# Patient Record
Sex: MALE | Race: WHITE
[De-identification: names, ages, dates, MRNs, and addresses within clinical notes are randomized per-mention and may not be internally consistent; named-entity substitution may affect disease eponyms.]

---

## 2017-04-17 ENCOUNTER — HOSPITAL ENCOUNTER (OUTPATIENT)
Dept: HOSPITAL 45 - C.PET | Age: 62
Discharge: HOME | End: 2017-04-17
Attending: RADIOLOGY
Payer: COMMERCIAL

## 2017-04-17 DIAGNOSIS — C77.2: ICD-10-CM

## 2017-04-17 DIAGNOSIS — C81.95: Primary | ICD-10-CM

## 2017-04-17 NOTE — DIAGNOSTIC IMAGING REPORT
PET/CT



HISTORY:      LYMPHOMA



TECHNIQUE: PET/CT was performed from the base of the skull through the pelvis

following the intravenous administration of 13.9 mCi of F18-FDG. Non-contrast CT

imaging was performed over the same range without breath-hold for attenuation

correction of PET images and anatomic correlation, but not for primary

interpretation as it is not of standard diagnostic quality.



CT DOSE: 873.23 mGycm

 

COMPARISON: PET CT 9/26/2016.



FINDINGS:

 

HEAD AND NECK:  Soft tissue thickening within the right posterior nasopharynx

has resolved. The asymmetric FDG uptake has also improved. This demonstrates an

SUV max of 4, previously demonstrating an SUV max of 7.4.

 

CHEST:  There is no FDG-avid disease in the chest.  There is no axillary,

mediastinal, or hilar lymphadenopathy.  There is no pleural or pericardial

effusion.  There is no air-space disease or suspicious lung nodule.

 

ABDOMEN/PELVIS:  Below the diaphragm, tracer is distributed physiologically in

the gastrointestinal and genitourinary tracts. No change in size within the

peripancreatic, mesenteric and retroperitoneal lymphadenopathy. Dominant

peripancreatic lymph node measures 3.2 x 2.1 cm. However, the FDG uptake

associated with these lymph nodes have essentially resolved in the interval. No

new lymphadenopathy.

 

MUSCULOSKELETAL:  There is no FDG-avid or destructive bone lesion.

 

IMPRESSION:  

1. No change in size within the abdominal lymphadenopathy. However, the FDG

uptake associated with these lymph nodes has essentially resolved.

2. Decrease in the FDG uptake associated with the right posterior nasopharynx.

The focal soft tissue thickening has resolved.







Electronically signed by:  Velasquez Melendrez M.D.

4/17/2017 11:39 AM



Dictated Date/Time:  4/17/2017 11:21 AM

## 2017-04-20 ENCOUNTER — HOSPITAL ENCOUNTER (OUTPATIENT)
Dept: HOSPITAL 45 - C.ONC | Age: 62
Discharge: HOME | End: 2017-04-20
Attending: PHYSICIAN ASSISTANT
Payer: COMMERCIAL

## 2017-04-20 VITALS
HEART RATE: 82 BPM | DIASTOLIC BLOOD PRESSURE: 79 MMHG | OXYGEN SATURATION: 95 % | TEMPERATURE: 98.96 F | SYSTOLIC BLOOD PRESSURE: 150 MMHG

## 2017-04-20 DIAGNOSIS — Z85.72: ICD-10-CM

## 2017-04-20 DIAGNOSIS — Z92.3: ICD-10-CM

## 2017-04-20 DIAGNOSIS — Z08: Primary | ICD-10-CM

## 2017-04-20 NOTE — RADIATION ONCOLOGY FOLLOW-UP
Radiation Oncology Follow-Up


Date of Visit


Apr 20, 2017.


 (Margaux Lan PA-C)





Reason For Visit


6 month follow-up


 (Margaux Lan PA-C)





Radiation Completion Date


3/21/16


 (Margaux Lan PA-C)





Diagnosis





(1) Hodgkin lymphoma


Stage:  lll


Permanent Comment:  DIAGNOSIS: Hodgkins Lymphoma, classical, nodular sclerosing

, refractory, stage IIIAS





TREATMENT: 


1. Initial treatment - ABVD x 6 cycles - 6/19/2013


2. Relapse treated with ICE and autologous stem cell transplant - 10/2013


3. Refractory disease has been treated with Brentuximab from 11/2014 to current


4. Status post completion of radiation therapy 03/21/2016 received 4500 cGy to 

the abdomen  Last Edited By: Kassy Pfeiffer on Apr 21, 2016 15:27 (Margaux Lan PA-C)





History of Present Illness


Mr. Luevano is a 62-year-old gentleman who was initially diagnosed with nodular 

sclerosing Hodgkin's lymphoma (stage III) in January 2013.  The patient 

underwent 6 cycles of ABVD chemotherapy and had a good response to treatment.  

Unfortunately he did have a relapse disease in the inguinal lymph nodes which 

was biopsy-proven and then went on to receive 2 cycles of ICE chemotherapy 

followed by an autologous stem cell transplantation which was completed in 

October 2013.  The patient then did have a good response to treatment for 

almost 6 months but then showed a metabolically active lymph nodes in the edmundo 

hepatis, periaortic and other retroperitoneal lymph nodes.  The patient was 

subsequently started on Brentuximab has been on it since November 2014.  He has 

had a mixed response to treatment and more recently has been having progression 

of disease.  His most recent PET scan was completed on 10/21/2015 which 

revealed FDG no luminal avid disease which has worsened since the prior 

examination.  Specifically, there is a left adrenal nodule with increased FDG 

uptake, increased activity adjacent to surgical clips in the edmundo hepatic and 

pyloric region, an FDG avid lymph node in the edmundo hepatic region, enlarging 

periaortic lymph node with maximum SUV of 6.45 and several small FDG avid 

retroperitoneal lymph nodes.  Also noted were splenic lymph nodes with SUV 

uptake of 4.89.  The patient was seen by Dr. Kamaljit Pfeiffer again and the 

recommendation was for consideration of potential consolidative radiation 

therapy for the progressive disease.





Currently, the patient is doing relatively well.  His only complaint of this 

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 

he is asymptomatic.  He denies any fevers, drenching night sweats or chills.  

He has no weight loss and his appetite is good.


Status post completion of radiation therapy to the PET avid areas of the 

abdomen.  Radiation was completed 03/21/2016.  He received 4500 cGy.


 (Margaux Lan PA-C)





Interim History


His been doing well over the past 6 months.  He denies any abdominal 

discomfort.  No nausea or vomiting.  He has had a 12 pound weight gain since 

January.  The PET scan 09/26/2016 showed significant improvement in the FDG 

avid lymphadenopathy within the abdomen.  Using new FDG avid focus within the 

right posterior nasopharynx with associated soft tissue thickening.  This 

raises the possibility of lymphomatous involvement.  He was referred to ENT was 

seen by Dr. Grewal.  On 10/14/2016 he had a nasopharyngeal biopsy and 

adenoidectomy.  The path report revealed atypical lymphoid infiltrate.  

Specimen -S.  This was sent to the [facility] for evaluation by Dr. Consuelo Claudio.  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  A recheck PET 

scan was performed on 04/17/2017.  This showed no change within the abdominal 

lymphadenopathy.  However, the FDG uptake associated with these lymph nodes 

have essentially resolved.  Decrease in the FDG uptake associated with the 

right posterior nasal pharynx.  The focal soft tissue thickening has resolved.





Patient states he did not have any signs or symptoms of sinus problems at the 

time of the finding on the previous PET scan.  He continues to feel fine and 

has no difficulty with his sinuses.


 (Margaux Lan PA-C)





Allergies


Coded Allergies:  


     POLLEN (Verified  Allergy, Intermediate, SNEEZING,WATERY EYES,COUGH, 10/14/

16)


     Cat Dander (Verified  Allergy, Unknown, >, 10/14/16)


     NO KNOWN DRUG ALLERGIES (Verified  Allergy, Unknown, ., 10/14/16)





Home Medications


Scheduled


Alfalfa (Yadkin), 250 MG PO DAILY


Ascorbic Acid (Vitamin C), 500 MG PO DAILY


Calcium W/ Magnesium (Calcium & Magnesium), 1 TAB PO DAILY


Cholecalciferol (Vitamin D), 2,000 INTER.UNIT PO DAILY


Hydrochlorothiazide (Hctz), 25 MG PO QAM


Multiple Vitamin (Multivitamin), 1 TAB PO DAILY


Simvastatin (Zocor), 20 MG PO QAM


[Annona Oil], 1 CAPSULE PO BID





Review of Systems


Gastrointestinal:  


   Symptoms:  WNL


Oral:  


   Symptoms:  No Problems


Respiratory:  


   Symptoms:  WNL


   Respiratory Comments:  PND causes moist cough at times


Urinary:  


   Symptoms:  WNL


Skin:  


   Symptoms:  No Problems


 (Margaux Lan PA-C)





Physical Exam





Vital Signs








  Date Time  Temp Pulse Resp B/P Pulse Ox O2 Delivery O2 Flow Rate FiO2


 


4/20/17 14:38 37.2 82 16 150/79 95   








Fatigue:  None


General Appearance:  no apparent distress


Eyes:  normal inspection, EOMI


ENT:  normal ENT inspection, hearing grossly normal, TMs normal, pharynx normal


Neck:  supple, no adenopathy, thyroid normal


Respiratory/Chest:  lungs clear, no respiratory distress, no accessory muscle 

use


Cardiovascular:  regular rate, rhythm, no gallop, no murmur


Abdomen:  non tender, soft, no organomegaly


Extremities:  no pedal edema


Neurologic/Psychiatric:  no motor/sensory deficits, alert, normal mood/affect


Skin:  warm/dry


Lymphatic:  no adenopathy


 (Margaux Lan PA-C)





Additional Studies


PET/CT





HISTORY:      LYMPHOMA





TECHNIQUE: PET/CT was performed from the base of the skull through the pelvis


following the intravenous administration of 13.9 mCi of F18-FDG. Non-contrast CT


imaging was performed over the same range without breath-hold for attenuation


correction of PET images and anatomic correlation, but not for primary


interpretation as it is not of standard diagnostic quality.





CT DOSE: 873.23 mGycm


 


COMPARISON: PET CT 9/26/2016.





FINDINGS:


 


HEAD AND NECK:  Soft tissue thickening within the right posterior nasopharynx


has resolved. The asymmetric FDG uptake has also improved. This demonstrates an


SUV max of 4, previously demonstrating an SUV max of 7.4.


 


CHEST:  There is no FDG-avid disease in the chest.  There is no axillary,


mediastinal, or hilar lymphadenopathy.  There is no pleural or pericardial


effusion.  There is no air-space disease or suspicious lung nodule.


 


ABDOMEN/PELVIS:  Below the diaphragm, tracer is distributed physiologically in


the gastrointestinal and genitourinary tracts. No change in size within the


peripancreatic, mesenteric and retroperitoneal lymphadenopathy. Dominant


peripancreatic lymph node measures 3.2 x 2.1 cm. However, the FDG uptake


associated with these lymph nodes have essentially resolved in the interval. No


new lymphadenopathy.


 


MUSCULOSKELETAL:  There is no FDG-avid or destructive bone lesion.


 


IMPRESSION:  


1. No change in size within the abdominal lymphadenopathy. However, the FDG


uptake associated with these lymph nodes has essentially resolved.


2. Decrease in the FDG uptake associated with the right posterior nasopharynx.


The focal soft tissue thickening has resolved.











Electronically signed by:  Velasquez Melendrez M.D.


4/17/2017 11:39 AM





Dictated Date/Time:  4/17/2017 11:21 AM





 


 (Margaux Lan PA-C)





Assessment & Plan


Plan: The patient was seen and examined by Dr. Pfeiffer.  The studies were 

reviewed.  We'll plan to follow up with a recheck PET scan in 6 months.  Most 

recent laboratory studies were reviewed.  An NCCN guidelines were reviewed.  We'

ll have him return in 6 months.  We'll obtain laboratory studies at that time 

if they have not been obtained by his PCP.  This would include a CBCD and chem 

profile.  He may call PSA questions or concerns in the interim.


 (Margaux Lan PA-C)


I agree with note created by Margaux Lan PA-C. I reviewed the patient's 

chart and information with her.  I have examined and evaluated the patient. I 

reviewed relevant clinical information and answered the patient's and/or family'

s questions. 


 (Parag Pfeiffer MD)


Total Time In Follow-Up


I spent 20 minutes speaking to the patient performing examination.  I spent 15 

minutes reviewing information in completing this note.


 (Margaux Lan PA-C)


I spent 15 minutes examining and counseling the patient.  


 (Parag Pfeiffer MD)





Copy To


Britta Layne D.O.

## 2017-10-23 ENCOUNTER — HOSPITAL ENCOUNTER (OUTPATIENT)
Dept: HOSPITAL 45 - C.PET | Age: 62
Discharge: HOME | End: 2017-10-23
Attending: PHYSICIAN ASSISTANT
Payer: COMMERCIAL

## 2017-10-23 DIAGNOSIS — C81.95: Primary | ICD-10-CM

## 2017-10-23 DIAGNOSIS — C77.2: ICD-10-CM

## 2017-10-23 NOTE — DIAGNOSTIC IMAGING REPORT
******** ADDENDUM ********





Addendum to the following report. The dictated report on 10/23/2017

inadvertently corresponds to the images from PET/CT 4/17/2017. The entire report

will be redictated within the findings section of the following addendum.





FINDINGS:



HEAD AND NECK: Decreased metabolic activity about the nasopharynx from

comparison with only slightly increased uptake about the left nasopharynx, SUV

max 2.8 suggesting physiologic or inflammatory changes. Mildly increased

radiotracer uptake of the oral pharynx, glottis and left true vocal cords

without corresponding soft tissue abnormality on the CT component of the study

is likely physiologic as well, possibly related to recent speech. No

hypermetabolic adenopathy of the head and neck identified.



CHEST: There is new multifocal FDG avid adenopathy about the chest.

Hypermetabolic AP window lymph node measuring up to 8 mm in short axis on image

80 of series 2 with SUV max of 3.3. Hypermetabolic 8 mm lymph node posterior to

the left mainstem bronchus on image 86 series 2 with SUV max of 5.4. Enlarged

lymph node anterior to the right mainstem bronchus measuring 1.3 cm on image 83

series 2 demonstrates SUV max of 5.1. The largest lymph node of the chest

involves the left hilum on image 91 series 2, 2.8 x 1.6 cm with SUV max of 5.9.

Additional smaller hypermetabolic left hilar lymph nodes are also present. These

are all new from comparison study of 4/17/2017. No hypermetabolic pulmonary

nodules or masses identified.



ABDOMEN AND PELVIS: The spleen is normal in size without evidence of

hypermetabolic activity. Physiologic radiotracer uptake involves the liver,

kidneys and gastrointestinal tract. There are persistent enlarged lymph nodes

about the abdomen including peripancreatic, mesenteric and retroperitoneal lymph

nodes. For example, there is an enlarged gastrohepatic lymph node, 3.2 x 2.0 cm

which previously measured 3.1 x 2.1 cm. Lymph node posterior to the splenic vein

on image 145 series 2 measures 11 mm in short axis, previously 12 mm. 11 mm

lymph node on series 151 series 2 previously measured 10 mm. Enlarged left

paraaortic lymph node on image 170 series 2 measures 13.5 mm in short axis,

previously 15 mm. No new adenopathy of the abdomen or pelvis. No significant FDG

uptake is seen within the enlarged lymph nodes.



MUSCULOSKELETAL SYSTEM AND EXTREMITIES: Physiologic distribution of activity

within the bone marrow without hypermetabolic foci. Mildly increased uptake

about the hips and shoulders is likely degenerative related.



INCIDENTAL CT FINDINGS: Heart is mildly enlarged. Coronary arterial

calcifications are noted. Symmetric bilateral gynecomastia. Lung fields are

generally clear with minimal dependent bibasilar atelectasis. Fatty infiltration

of the liver. Unchanged mild stranding of the mid mesentery adjacent to the

previously described enlarged lymph nodes. No bowel obstruction. Partially

collapsed urinary bladder. Normal appendix. Mild symmetric atrophy of the

bilateral rectus femoris musculature.





IMPRESSION:

1. New bulky hypermetabolic mediastinal and hilar adenopathy is consistent with

disease progression.

2. Stable disease of the abdomen.

3. Normal appearance of the spleen and bone marrow.

4. Additional findings as above.







Electronically signed by:  Ari Isaacs M.D.

10/25/2017 8:05 PM



Dictated Date/Time:  10/25/2017 7:31 PM



******** ORIGINAL REPORT ********





PET/CT SKULL-THIGH



CLINICAL HISTORY: 62 years-old Male with LYMPHOMA.  Subsequent treatment

strategy. FDG avid lymphadenopathy of the abdomen seen on comparison study.



COMPARISON: PET CT 4/17/2017



TECHNIQUE: The patient was injected with 10.8 mCi of F-18 fluorodeoxyglucose

(FDG) and an emission scan was performed from the skull vertex to the toes.

Noncontrast CT was performed for attenuation correction and anatomic

localization.  The blood glucose level was 110 mg/dl.



FINDINGS:



HEAD AND NECK: Generally symmetric increased metabolic activity is noted within

the nasopharynx, SUV max of 5.9 on the right, previously measured at 4. No

appreciable soft tissue thickening within this distribution. Otherwise, there is

a physiologic distribution of activity, with no suspicious hypermetabolic foci.



CHEST: There is a physiologic distribution of activity, with no hypermetabolic

mediastinal, hilar or pulmonary foci.



ABDOMEN AND PELVIS: Multiple enlarged peripancreatic, retroperitoneal and

mesenteric lymph nodes are again seen which appear stable in size and appearance

from comparison. Index peripancreatic lymph node on image 139 series 2 measures

3.2 x 2.0 cm, previously 3.2 x 2.1 cm. Mild stranding within the mesentery

adjacent to these nodes again seen. There is no significant FDG uptake within

the enlarged lymph nodes. No new adenopathy. There is a physiologic distribution

of activity within the liver, spleen, adrenal glands, gastrointestinal and

urinary tracts, with no hypermetabolic foci.



MUSCULOSKELETAL SYSTEM AND EXTREMITIES: There is a physiologic distribution of

activity within the bone marrow, with no hypermetabolic foci.



ADDITIONAL CT FINDINGS: Coronary arterial disease and mild cardiomegaly.

Symmetric bilateral gynecomastia. Dependent bibasilar atelectasis. Fatty

infiltration of the liver. Moderate to severe diffuse pancreatic atrophy.

Atherosclerosis of the abdominal aorta. No bowel obstruction. Normal appendix.

Mild atrophy of the bilateral rectus femoris musculature.



IMPRESSION: 

1. Stable exam with no change in size or appearance of the abdominal adenopathy

demonstrating no significant hypermetabolic activity. No new adenopathy

identified.

2. Mild symmetrically increased FDG uptake about the nasopharynx as above

suggests physiologic changes without focal mass or soft tissue thickening.

3. Fatty infiltration of the liver.







The above report was generated using voice recognition software. It may contain

grammatical, syntax or spelling errors.











Electronically signed by:  Ari Isaacs M.D.

10/23/2017 3:00 PM



Dictated Date/Time:  10/23/2017 2:44 PM

## 2017-10-25 ENCOUNTER — HOSPITAL ENCOUNTER (OUTPATIENT)
Dept: HOSPITAL 45 - C.ONC | Age: 62
Discharge: HOME | End: 2017-10-25
Attending: PHYSICIAN ASSISTANT
Payer: COMMERCIAL

## 2017-10-25 VITALS
HEART RATE: 77 BPM | OXYGEN SATURATION: 96 % | SYSTOLIC BLOOD PRESSURE: 147 MMHG | DIASTOLIC BLOOD PRESSURE: 80 MMHG | TEMPERATURE: 98.6 F

## 2017-10-25 DIAGNOSIS — Z85.72: ICD-10-CM

## 2017-10-25 DIAGNOSIS — Z92.3: ICD-10-CM

## 2017-10-25 DIAGNOSIS — Z08: Primary | ICD-10-CM

## 2017-10-25 NOTE — RADIATION ONCOLOGY FOLLOW-UP
Radiation Oncology Follow-Up


Date of Visit


Oct 25, 2017.





Reason For Visit


6 month follow-up





Radiation Completion Date


03/21/16





Diagnosis





(1) Hodgkin lymphoma


Permanent Comment:  DIAGNOSIS: Hodgkins Lymphoma, classical, nodular sclerosing

, refractory, stage IIIAS





TREATMENT: 


1. Initial treatment - ABVD x 6 cycles - 6/19/2013


2. Relapse treated with ICE and autologous stem cell transplant - 10/2013


3. Refractory disease has been treated with Brentuximab from 11/2014 to current


4. Status post completion of radiation therapy 03/21/2016 received 4500 cGy to 

the abdomen  Last Edited By: Kassy Pfeiffer on Apr 21, 2016 15:27





History of Present Illness


Mr. Luevano is a 62-year-old gentleman who was initially diagnosed with nodular 

sclerosing Hodgkin's lymphoma (stage III) in January 2013.  The patient 

underwent 6 cycles of ABVD chemotherapy and had a good response to treatment.  

Unfortunately he did have a relapse disease in the inguinal lymph nodes which 

was biopsy-proven and then went on to receive 2 cycles of ICE chemotherapy 

followed by an autologous stem cell transplantation which was completed in 

October 2013.  The patient then did have a good response to treatment for 

almost 6 months but then showed a metabolically active lymph nodes in the edmundo 

hepatis, periaortic and other retroperitoneal lymph nodes.  The patient was 

subsequently started on Brentuximab has been on it since November 2014.  He has 

had a mixed response to treatment and more recently has been having progression 

of disease.  His most recent PET scan was completed on 10/21/2015 which 

revealed FDG no luminal avid disease which has worsened since the prior 

examination.  Specifically, there is a left adrenal nodule with increased FDG 

uptake, increased activity adjacent to surgical clips in the edmundo hepatic and 

pyloric region, an FDG avid lymph node in the edmundo hepatic region, enlarging 

periaortic lymph node with maximum SUV of 6.45 and several small FDG avid 

retroperitoneal lymph nodes.  Also noted were splenic lymph nodes with SUV 

uptake of 4.89.  The patient was seen by Dr. Kamaljit Pfeiffer again and the 

recommendation was for consideration of potential consolidative radiation 

therapy for the progressive disease.





Currently, the patient is doing relatively well.  His only complaint of this 

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 

he is asymptomatic.  He denies any fevers, drenching night sweats or chills.  

He has no weight loss and his appetite is good.


Status post completion of radiation therapy to the PET avid areas of the 

abdomen.  Radiation was completed 03/21/2016.  He received 4500 cGy.





Interim History


He does not report any changes over the past 6 months.  His weight is stable.  

He does exercise on a regular basis.  His appetite is good.  He did not have 

any complaints of night sweats.  He has had some nasal drainage with chest 

congestion.  There has been a productive cough.  He has persistent neuropathy 

of the fingers and toes since the chemotherapy.





Allergies


Coded Allergies:  


     POLLEN (Verified  Allergy, Intermediate, SNEEZING,WATERY EYES,COUGH, 10/14/

16)


     Cat Dander (Verified  Allergy, Unknown, >, 10/14/16)


     NO KNOWN DRUG ALLERGIES (Verified  Allergy, Unknown, ., 10/14/16)





Home Medications


Scheduled


Alfalfa (Caswell), 250 MG PO DAILY


Ascorbic Acid (Vitamin C), 500 MG PO DAILY


Calcium W/ Magnesium (Calcium & Magnesium), 1 TAB PO DAILY


Cholecalciferol (Vitamin D), 2,000 INTER.UNIT PO DAILY


Hydrochlorothiazide (Hctz), 25 MG PO QAM


Multiple Vitamin (Multivitamin), 1 TAB PO DAILY


Simvastatin (Zocor), 20 MG PO QAM


[Skyforest Oil], 1 CAPSULE PO BID





Review of Systems


Gastrointestinal:  


   Symptoms:  WNL


Oral:  


   Other Oral Symptoms:  nasal drainage with weather changes


Respiratory:  


   Symptoms:  Productive Cough


   Sputum Character:  does not check the color cough due to nasal drainage


Urinary:  


   Symptoms:  WNL


Skin:  


   Symptoms:  No Problems





Physical Exam





Vital Signs








  Date Time  Temp Pulse Resp B/P (MAP) Pulse Ox O2 Delivery O2 Flow Rate FiO2


 


10/25/17 13:31 37.0 77 16 147/80 96   








Fatigue:  None


General Appearance:  no apparent distress


Eyes:  normal inspection, EOMI


ENT:  normal ENT inspection, hearing grossly normal


Neck:  no adenopathy, thyroid normal


Respiratory/Chest:  lungs clear, no respiratory distress, no accessory muscle 

use


Cardiovascular:  regular rate, rhythm, no gallop, no murmur


Abdomen:  non tender, soft, no organomegaly


Extremities:  no pedal edema


Neurologic/Psychiatric:  no motor/sensory deficits, alert, normal mood/affect


Skin:  warm/dry





Additional Studies


 











Patient:  UBALDOJOSE DE JESUS BARTH Address1:  [address] [place] [address] Rec:  [id number] Address2:  


 


Acct ID:  [id number] [facility] [place] Zip:  [place] [id number]


 


YOB: 1955          Sex:  M Room/Bed:  


 


Ref Phy:   SC: ROMIEPET


 


Att Phy:  Margaux Lan PA-C Report #:  [id number]


 


Kamilah Phy:  Britta Layne D.O. Test:  PETCTST


 


Admit Phy:   Technician:  RHONDA


 


Interpreting Phy:  Ottoniel Isaacs D.O. Diagnosis:  LYMPHOMA


 


Ordering Phy:  Margaux Lan PA-C Service Date:  10/23/17


 


Admit Date: 10/23/17 MNE: [email or web address]


 


 CONF:


 


 DICTATED BY: Ottoniel Isaacs D.O.]]


 


CC: Margaux Lan PA-C Kopinski, Laura, D.O.


 


 Endcc:











[~ rep ct add3]]








******** ADDENDUM ********








Addendum to the following report. The dictated report on 10/23/2017


inadvertently corresponds to the images from PET/CT 4/17/2017. The entire report


will be redictated within the findings section of the following addendum.








FINDINGS:





HEAD AND NECK: Decreased metabolic activity about the nasopharynx from


comparison with only slightly increased uptake about the left nasopharynx, SUV


max 2.8 suggesting physiologic or inflammatory changes. Mildly increased


radiotracer uptake of the oral pharynx, glottis and left true vocal cords


without corresponding soft tissue abnormality on the CT component of the study


is likely physiologic as well, possibly related to recent speech. No


hypermetabolic adenopathy of the head and neck identified.





CHEST: There is new multifocal FDG avid adenopathy about the chest.


Hypermetabolic AP window lymph node measuring up to 8 mm in short axis on image


80 of series 2 with SUV max of 3.3. Hypermetabolic 8 mm lymph node posterior to


the left mainstem bronchus on image 86 series 2 with SUV max of 5.4. Enlarged


lymph node anterior to the right mainstem bronchus measuring 1.3 cm on image 83


series 2 demonstrates SUV max of 5.1. The largest lymph node of the chest


involves the left hilum on image 91 series 2, 2.8 x 1.6 cm with SUV max of 5.9.


Additional smaller hypermetabolic left hilar lymph nodes are also present. These


are all new from comparison study of 4/17/2017. No hypermetabolic pulmonary


nodules or masses identified.





ABDOMEN AND PELVIS: The spleen is normal in size without evidence of


hypermetabolic activity. Physiologic radiotracer uptake involves the liver,


kidneys and gastrointestinal tract. There are persistent enlarged lymph nodes


about the abdomen including peripancreatic, mesenteric and retroperitoneal lymph


nodes. For example, there is an enlarged gastrohepatic lymph node, 3.2 x 2.0 cm


which previously measured 3.1 x 2.1 cm. Lymph node posterior to the splenic vein


on image 145 series 2 measures 11 mm in short axis, previously 12 mm. 11 mm


lymph node on series 151 series 2 previously measured 10 mm. Enlarged left


paraaortic lymph node on image 170 series 2 measures 13.5 mm in short axis,


previously 15 mm. No new adenopathy of the abdomen or pelvis. No significant FDG


uptake is seen within the enlarged lymph nodes.





MUSCULOSKELETAL SYSTEM AND EXTREMITIES: Physiologic distribution of activity


within the bone marrow without hypermetabolic foci. Mildly increased uptake


about the hips and shoulders is likely degenerative related.





INCIDENTAL CT FINDINGS: Heart is mildly enlarged. Coronary arterial


calcifications are noted. Symmetric bilateral gynecomastia. Lung fields are


generally clear with minimal dependent bibasilar atelectasis. Fatty infiltration


of the liver. Unchanged mild stranding of the mid mesentery adjacent to the


previously described enlarged lymph nodes. No bowel obstruction. Partially


collapsed urinary bladder. Normal appendix. Mild symmetric atrophy of the


bilateral rectus femoris musculature.








IMPRESSION:


1. New bulky hypermetabolic mediastinal and hilar adenopathy is consistent with


disease progression.


2. Stable disease of the abdomen.


3. Normal appearance of the spleen and bone marrow.


4. Additional findings as above.











Electronically signed by:  Ari Isaacs M.D.


10/25/2017 8:05 PM





Dictated Date/Time:  10/25/2017 7:31 PM





******** ORIGINAL REPORT ********








PET/CT SKULL-THIGH





CLINICAL HISTORY: 62 years-old Male with LYMPHOMA.  Subsequent treatment


strategy. FDG avid lymphadenopathy of the abdomen seen on comparison study.





COMPARISON: PET CT 4/17/2017





TECHNIQUE: The patient was injected with 10.8 mCi of F-18 fluorodeoxyglucose


(FDG) and an emission scan was performed from the skull vertex to the toes.


Noncontrast CT was performed for attenuation correction and anatomic


localization.  The blood glucose level was 110 mg/dl.





FINDINGS:





HEAD AND NECK: Generally symmetric increased metabolic activity is noted within


the nasopharynx, SUV max of 5.9 on the right, previously measured at 4. No


appreciable soft tissue thickening within this distribution. Otherwise, there is


a physiologic distribution of activity, with no suspicious hypermetabolic foci.





CHEST: There is a physiologic distribution of activity, with no hypermetabolic


mediastinal, hilar or pulmonary foci.





ABDOMEN AND PELVIS: Multiple enlarged peripancreatic, retroperitoneal and


mesenteric lymph nodes are again seen which appear stable in size and appearance


from comparison. Index peripancreatic lymph node on image 139 series 2 measures


3.2 x 2.0 cm, previously 3.2 x 2.1 cm. Mild stranding within the mesentery


adjacent to these nodes again seen. There is no significant FDG uptake within


the enlarged lymph nodes. No new adenopathy. There is a physiologic distribution


of activity within the liver, spleen, adrenal glands, gastrointestinal and


urinary tracts, with no hypermetabolic foci.





MUSCULOSKELETAL SYSTEM AND EXTREMITIES: There is a physiologic distribution of


activity within the bone marrow, with no hypermetabolic foci.





ADDITIONAL CT FINDINGS: Coronary arterial disease and mild cardiomegaly.


Symmetric bilateral gynecomastia. Dependent bibasilar atelectasis. Fatty


infiltration of the liver. Moderate to severe diffuse pancreatic atrophy.


Atherosclerosis of the abdominal aorta. No bowel obstruction. Normal appendix.


Mild atrophy of the bilateral rectus femoris musculature.





IMPRESSION: 


1. Stable exam with no change in size or appearance of the abdominal adenopathy


demonstrating no significant hypermetabolic activity. No new adenopathy


identified.


2. Mild symmetrically increased FDG uptake about the nasopharynx as above


suggests physiologic changes without focal mass or soft tissue thickening.


3. Fatty infiltration of the liver.











The above report was generated using voice recognition software. It may contain


grammatical, syntax or spelling errors.

















Electronically signed by:  Ari Isaacs M.D.


10/23/2017 3:00 PM





Dictated Date/Time:  10/23/2017 2:44 PM





Assessment & Plan


Plan: The results of the PET scan were reviewed with the patient.  This was 

showing stable changes.  The PET/CT was reviewed by Dr. Pfeiffer with radiology 

and it is felt that there is uptake in the chest.  This information was 

reviewed by Dr. Pfeiffer with the patient.  Patient's case will be reviewed at 

tumor board.  The PET scan results were amended by radiology.





Assessment & Plan (Attending)


Mr. Luevano unfortunately appears to have developed recurrent Hodgkins lymphoma 

in the mediastinum and potentially in the pelvis as well. I have re-reviewed 

the imaging studies with radiology who agrees as well. At this point, we did 

have an in-depth discussion regarding moving forward. Our discussion included 

potentially obtaining a biopsy to confirm recurrence in addition to speaking 

with a medical oncologist.  Until now, the patient has been reluctant to 

reconsider systemic therapy due to his previous side effects after treatment 

and would prefer radiation therapy. I have explained to him that radiation 

therapy is most likely not going to be beneficial at this point and I would 

strongly urge the patient to consider systemic therapy potentially underneath 

the supervision of a medical oncologist. At this point, the patient has 

requested that I present his case at our next tumor board and then call him 

with the final recommendations which may include obtaining a tissue diagnosis 

and/or referral to medical oncology. We are in agreement with this plan and I 

will call him after tumor board next week. He was encouraged to call me with 

any further questions or concerns.





Total Time


In Follow-Up


I spent 20 minutes speaking to the patient and performing examination.  I spent 

15 minutes reviewing information in completing this note.  AK





Total Time (Attending)


In Follow-Up


I spent 30 minutes examining and counseling the patient. I spent 15 minutes 

completing this note. 





Copy To


Britta Layne D.O.; Kamaljit Pfeiffer M.D.

## 2017-12-01 ENCOUNTER — HOSPITAL ENCOUNTER (OUTPATIENT)
Dept: HOSPITAL 45 - C.LAB1850 | Age: 62
Discharge: HOME | End: 2017-12-01
Attending: INTERNAL MEDICINE
Payer: COMMERCIAL

## 2017-12-01 DIAGNOSIS — R59.0: Primary | ICD-10-CM

## 2017-12-01 LAB
BASOPHILS # BLD: 0.01 K/UL (ref 0–0.2)
BASOPHILS NFR BLD: 0.2 %
COMPLETE: YES
EOSINOPHIL NFR BLD AUTO: 89 K/UL (ref 130–400)
HCT VFR BLD CALC: 33.8 % (ref 42–52)
IG%: 0.8 %
IMM GRANULOCYTES NFR BLD AUTO: 18.7 %
INR PPP: 0.9 (ref 0.9–1.1)
LYMPHOCYTES # BLD: 0.95 K/UL (ref 1.2–3.4)
MCH RBC QN AUTO: 32.9 PG (ref 25–34)
MCHC RBC AUTO-ENTMCNC: 34.3 G/DL (ref 32–36)
MCV RBC AUTO: 95.8 FL (ref 80–100)
MONOCYTES NFR BLD: 9.4 %
NEUTROPHILS # BLD AUTO: 1.4 %
NEUTROPHILS NFR BLD AUTO: 69.5 %
PARTIAL THROMBOPLASTIN RATIO: 1
PLATELET # BLD EST: (no result) 10*3/UL
PMV BLD AUTO: 10.9 FL (ref 7.4–10.4)
PROTHROMBIN TIME: 10.1 SECONDS (ref 9–12)
RBC # BLD AUTO: 3.53 M/UL (ref 4.7–6.1)
WBC # BLD AUTO: 5.08 K/UL (ref 4.8–10.8)

## 2017-12-05 ENCOUNTER — HOSPITAL ENCOUNTER (OUTPATIENT)
Dept: HOSPITAL 45 - C.ACU | Age: 62
Discharge: HOME | End: 2017-12-05
Attending: INTERNAL MEDICINE
Payer: COMMERCIAL

## 2017-12-05 VITALS
OXYGEN SATURATION: 96 % | HEART RATE: 65 BPM | TEMPERATURE: 98.24 F | DIASTOLIC BLOOD PRESSURE: 66 MMHG | SYSTOLIC BLOOD PRESSURE: 114 MMHG

## 2017-12-05 VITALS
WEIGHT: 230.49 LBS | HEIGHT: 72.01 IN | HEIGHT: 72.01 IN | BODY MASS INDEX: 31.22 KG/M2 | WEIGHT: 230.49 LBS | BODY MASS INDEX: 31.22 KG/M2 | BODY MASS INDEX: 31.22 KG/M2

## 2017-12-05 VITALS
OXYGEN SATURATION: 96 % | DIASTOLIC BLOOD PRESSURE: 53 MMHG | TEMPERATURE: 97.88 F | HEART RATE: 64 BPM | SYSTOLIC BLOOD PRESSURE: 100 MMHG

## 2017-12-05 VITALS
SYSTOLIC BLOOD PRESSURE: 114 MMHG | OXYGEN SATURATION: 95 % | TEMPERATURE: 97.88 F | DIASTOLIC BLOOD PRESSURE: 56 MMHG | HEART RATE: 60 BPM

## 2017-12-05 DIAGNOSIS — Z79.899: ICD-10-CM

## 2017-12-05 DIAGNOSIS — Z85.828: ICD-10-CM

## 2017-12-05 DIAGNOSIS — J39.2: ICD-10-CM

## 2017-12-05 DIAGNOSIS — C81.92: Primary | ICD-10-CM

## 2017-12-05 LAB
ANION GAP SERPL CALC-SCNC: 5 MMOL/L (ref 3–11)
BUN SERPL-MCNC: 17 MG/DL (ref 7–18)
BUN/CREAT SERPL: 19.3 (ref 10–20)
CALCIUM SERPL-MCNC: 8.6 MG/DL (ref 8.5–10.1)
CHLORIDE SERPL-SCNC: 106 MMOL/L (ref 98–107)
CO2 SERPL-SCNC: 26 MMOL/L (ref 21–32)
CREAT CL PREDICTED SERPL C-G-VRATE: 107.6 ML/MIN
CREAT SERPL-MCNC: 0.89 MG/DL (ref 0.6–1.4)
GLUCOSE SERPL-MCNC: 108 MG/DL (ref 70–99)
POTASSIUM SERPL-SCNC: 3.9 MMOL/L (ref 3.5–5.1)
SODIUM SERPL-SCNC: 137 MMOL/L (ref 136–145)

## 2017-12-05 NOTE — ANESTHESIOLOGY PROGRESS NOTE
Anesthesia Post Op Note


Date & Time


Dec 5, 2017 at 13:59





Vital Signs


Pain Intensity:  0





Vital Signs Past 12 Hours








  Date Time  Temp Pulse Resp B/P (MAP) Pulse Ox O2 Delivery O2 Flow Rate FiO2


 


12/5/17 13:45  66 16 111/75 100 Oxymask 10 


 


12/5/17 13:35 36.4 74 16 128/83 99 Oxymask 10 


 


12/5/17 10:27 36.8 65 18 114/66 (82) 96 Room Air  











Notes


Mental Status:  alert / awake / arousable, participated in evaluation


Pt Amnestic to Procedure:  Yes


Nausea / Vomiting:  adequately controlled


Pain:  adequately controlled


Airway Patency, RR, SpO2:  stable & adequate


BP & HR:  stable & adequate


Hydration State:  stable & adequate


Anesthetic Complications:  no major complications apparent

## 2017-12-05 NOTE — HISTORY AND PHYSICAL
History & Physical


Date of Service


Dec 5, 2017.





History & Physical


Reason for Visit: EBUS evaluation





HPI:


62-year-old male here for evaluation on PET avid mediastinum:





He presents with notable PET avidity in his mediastinum with a diagnosis since 

12/31/2012 based off retroperitoneal mass revealing sclerosing Hodgkin's 

lymphoma. He has been treated with multiple different chemotherapeutic agents, 

autologous stem cell transplant is mile as radiation. He is being followed by 

Dr. Jordi Monroy. 





He initially presented in November 2012 with weakness and workup revealed 

enlarged mediastinal adenopathy along with left axillary lymph nodes. Biopsy of 

the left axillary and mediastinal nodes on 11/13/2012 were nondiagnostic but 

were suspicious for classical Hodgkin's lymphoma. Biopsy of a retroperitoneal 

mass on 12/31/2012 revealed classic Hodgkin's lymphoma, nodular sclerosing 

type. He was then treated with 6 cycles of ABVD from January 2013 through June of 2013. Follow-up PET-CT revealed FDG avid inguinal node which was biopsied on 

08/06/2013 revealed Hodgkin's lymphoma. He was then treated with 2 cycles of 

ICE followed by autologous stem cell transplant days 0 being 10/16/2013 at 

[facility]. On 08/13/2014 he did show some PET/CT FDG avidity in 

multiple abdominal lymph nodes suggesting progression. He then underwent 

laparoscopic biopsy on 10/06/2014 confirming relapsed Hodgkin's lymphoma. He 

was then moved to Brentuximab on 11/10/2014. Patient was noted both clinically 

and via PET-CT scans have excellent response after 4 cycles but during the 

therapy did require a dose reduction secondary to neuropathy. This treatment 

was continued until 01/2016. Repeat PET-CT on 01/20/2016 revealed increased in 

both size and metabolic activity numerous abdominal lymph nodes. He underwent 

IM RT with a total of 45 centigray over 25 fractions from [id number]. He 

was noted to have complete response in September 2016. This patient has been 

continually monitored and recent PET-CT on 10/23/2017 revealed bulky 

hypermetabolic mediastinal/hilar adenopathy. Biopsy of the patient's axillary 

mediastinal lymph nodes was not on diagnostic on 12/31/2013 Dr. Monroy 

clinical evaluation performed 11/20/2017 noted no clinical signs or symptoms 

recurrent disease.





Active Problems


1. Actinic keratosis (L57.0)


2. History of SCC (squamous cell carcinoma) of skin (Z85.828)


3. Lesion of nasopharynx (J39.2)


4. Neoplasm of uncertain behavior of skin (D48.5)


5. Nummular eczema (L30.0)


6. Squamous cell carcinoma in situ (D09.9)


7. Visit for pre-operative examination (Z01.818)





Past Medical History


1. History of actinic keratosis (Z87.2)


2. Denied: History of malignant hyperthermia


3. History of seborrheic dermatitis (Z87.2)


4. History of squamous cell carcinoma of skin (Z85.828)


5. History of Keratoacanthoma (L85.8)





Surgical History


1. History of Knee Surgery





Family History


1. Denied: Family history of Bleeding disorder


2. Family history of skin cancer (Z80.8)





Social History


 Alcohol use (Z78.9)


 Employed


 Former smokeless tobacco user


 Never a smoker





Current Meds


 1. Fluorouracil 5 % External Cream; APPLY A THIN LAYER TO AFFECTED AREA(S) 

TWICE DAILY


 2. Mometasone Furoate 0.1 % External Ointment; APPLY SPARINGLY TO AFFECTED AREA

(S) ONCE DAILY


 3. HydroCHLOROthiazide 25 MG Oral Tablet


 4. Simvastatin 20 MG Oral Tablet





Allergies


1. No Known Drug Allergies





Physical Exam:


General: Patient is awake, alert, cooperative, and in no acute distress. Well 

developed.  Well-nourished.


Head: Normocephalic, Atraumatic.


ENT: PERRLA, No discharge, EOMI, Sclera normal


Neck: Normal ROM. Trachea midline. No stridor


Respiratory:No respiratory distress. No accessory muscle use.


Cardiovascular: Regular rate and rhythm. 


Back: Normal inspection. 


Extremities: No edema, cyanosis. Normal ROM


Neuro: Alert, Oriented x 3. CN II-XII grossly intact. Sensation and motor 

function grossly intact.








Assessment and Plan:


Patient with Hodgkins lymphoma and mediastinal lymphadenopathy here for EBUS 

evaluation today and tissue sampling.

## 2017-12-05 NOTE — BRONCHOSCOPY PROCEDURE NOTE
Bronchoscopy Procedure Note


Procedure: Flexible-Bronchoscopy, EBUS,FNA, Tbbx, BAL   


Consent: Obtained through the patient placed into the chart


Pre-Procedural Dx: Mediastinal adenopathy


Post-Procedural Dx: Recurrence of sclerosing Hodgkin's of trauma


Analgesia:


   GETA


Sedation:


   GETA


Procedure:


The Olympus video bronchoscope and EBUS scope were used for this procedure 


Initially the flexible bronchoscope was used for evaluation of the airways.


An LMA Size 5 was used for this procedure and properly positioned


Vocal Cords: Anatomically WNL


Sub-Glottis & Trachea:   Anatomically WNL


Melina: Anatomically within normal limits


Right bronchial tree:


   Right mainstem bronchus: Anatomically within normal limits


   Right upper lobe: Anatomically within normal limits


   Bronchus intermedius: Anatomically within normal limits


   Right middle lobe: Anatomically within normal limits


   Right lower lobe: Anatomically within normal limits


   Findings: No significant findings noted


Left bronchial tree:


   Left mainstem bronchus: Anatomically within normal limits


   Left upper lobe: Anatomically within normal limits


   Lingula: Anatomically within normal limits


   Left lower lobe: Anatomically within normal limits


   Findings: No significant findings noted


EBUS/BHUMIKA: FNA


   Brit Stations:


   7:  # of passes  8


   10R: # of passes   3


   


BAL:  Right middle lobe     


EBL: None


Complications:


   None


Follow-up:  PACU

## 2018-03-08 ENCOUNTER — HOSPITAL ENCOUNTER (OUTPATIENT)
Dept: HOSPITAL 45 - C.CTS | Age: 63
Discharge: HOME | End: 2018-03-08
Attending: INTERNAL MEDICINE
Payer: COMMERCIAL

## 2018-03-08 DIAGNOSIS — C81.10: Primary | ICD-10-CM

## 2018-03-08 NOTE — DIAGNOSTIC IMAGING REPORT
CT SCAN OF THE ABDOMEN AND PELVIS WITH IV CONTRAST



CLINICAL HISTORY:   Hodgkin's lymphoma.



COMPARISON STUDY:  PET CT dated 10/23/2017.



TECHNIQUE: Following the IV administration of  93 cc of Optiray 320, CT scan of

the abdomen and pelvis is performed from the lung bases to the proximal femora.

Images are reviewed in the axial, sagittal, and coronal planes. IV contrast was

administered without complication. A dose lowering technique was utilized

adhering to the principles of ALARA.



FINDINGS:



Lung bases: The heart is normal in size and without pericardial effusion. There

are coronary artery calcifications. A small calcified granuloma is identified

the left lung base. The lung bases are otherwise clear. There is a tiny hiatal

hernia.



Liver: The contrast-enhanced liver is enlarged, measuring 18.6 cm in length. The

liver demonstrates diffusely diminished attenuation consistent with hepatic

steatosis. There is no intrahepatic biliary ductal dilatation. The hepatic veins

and portal veins are patent.



Gallbladder: Contracted.



Spleen: Normal in size and attenuation, measuring 10.7 cm in length.



Pancreas: Atrophic and grossly unremarkable.



Adrenal glands: Unremarkable.



Kidneys: The contrast enhanced kidneys demonstrate mild cortical atrophy and are

without hydronephrosis. The kidneys enhance symmetrically.



Abdominal vasculature: The abdominal aorta is normal in course and caliber

noting mild to moderate atherosclerotic calcification.



Bowel: There is mild to moderate colonic fecal retention. No bowel obstruction

is seen. The appendix is  well-visualized and normal.



Peritoneum: There is no intraperitoneal free air or abdominal ascites. There is

a small fat-containing umbilical hernia.



Lymphadenopathy: There is enlarged gastrohepatic lymph node seen on image #90,

measuring 3.3 x 2.9 cm (previously measured 3.2 x 2.0 cm). An enlarged

peripancreatic node on image #114 measures 1.7 x 2.8 cm (previously measured 1.1

cm in short axis). There are mildly enlarged retroperitoneal nodes. A portacaval

node on image #110 and measures 1.3 cm in short axis (previously measured 1.0

cm). A left periaortic node on image #177 measures 2.4 x 1.6 cm (previously

measured 1.4 cm in short axis). Stranding throughout the retroperitoneum is

likely treatment related. There is no mesenteric, pelvic sidewall, or inguinal

lymphadenopathy.



Pelvic viscera: The prostate is diminutive. The bladder is decompressed and not

well evaluated. There our small bilateral fat-containing inguinal hernias.



Skeletal structures: The skeletal structures are osteopenic. There is mild to

moderate lumbosacral spondylosis. Arthritic change is also seen in the hips. No

lytic or blastic lesions are seen.





IMPRESSION:



1. There are enlarged upper abdominal and retroperitoneal lymph nodes. These

have modestly increased in size from the 10/23/2017 PET examination.



2. The spleen is normal in size.



3. Hepatomegaly and hepatic steatosis.



4. No acute infectious or inflammatory findings are identified in the abdomen or

pelvis.







Electronically signed by:  Tu Kenny M.D.

3/8/2018 3:54 PM



Dictated Date/Time:  3/8/2018 3:46 PM

## 2018-08-20 ENCOUNTER — HOSPITAL ENCOUNTER (OUTPATIENT)
Dept: HOSPITAL 45 - C.CTS | Age: 63
Discharge: HOME | End: 2018-08-20
Attending: INTERNAL MEDICINE
Payer: COMMERCIAL

## 2018-08-20 DIAGNOSIS — K76.0: ICD-10-CM

## 2018-08-20 DIAGNOSIS — C81.10: Primary | ICD-10-CM

## 2018-08-20 NOTE — DIAGNOSTIC IMAGING REPORT
CT ABD/PELVIS IV AND ORAL CONT



CLINICAL HISTORY: Hodgkin's lymphoma    



COMPARISON STUDY:  5/29/2018



TECHNIQUE: Following the IV administration of 92 mL of Optiray-320, CT scan of

the abdomen and pelvis was performed from the lung bases to the proximal femurs.

Images are reviewed in the axial, sagittal, and coronal planes. IV contrast was

administered without complication.  A dose lowering technique was utilized

adhering to the principles of ALARA.





CT DOSE: 1691.63 mGy.cm



FINDINGS:



Lower chest: There are mild basilar atelectatic changes.



Liver: There is hepatic steatosis. No focal hepatic masses are visualized.



Gallbladder: Unremarkable.



Spleen: Normal in size and attenuation.



Pancreas: There is fatty atrophy. No focal masses are visualized.



Adrenal glands: Unremarkable.



Kidneys: There is symmetric renal cortical enhancement. The kidneys are normal

in size without hydronephrosis.



Bowel: There are no transition zones indicate bowel obstruction. There is no

acute diverticulitis. There is no acute appendicitis.



Peritoneum: There is no intraperitoneal free air or abdominal ascites.



Vasculature: The abdominal aorta is normal in course and caliber.



Adenopathy: There is an enlarged lymph node superior to the pancreatic head

measuring 33 x 26 mm. This remains unchanged in size. There is an enlarged lymph

node located adjacent to the sciatic artery near its bifurcation. This measures

25 x 14 mm and is essentially unchanged in size. There are ill-defined

para-aortic lymph nodes with adjacent stranding which remain similar in size.

There is no pathologic inguinal lymphadenopathy. There is no pathologic iliac

lymphadenopathy.



Pelvic viscera: The bladder, and pelvic viscera are unremarkable.



Skeletal structures: No destructive osseous lesions are visualized. There are

moderate osteoarthritic changes within the right hip.



IMPRESSION:  

1. No significant change from the preceding study

2. Stable mild upper abdominal lymphadenopathy

3. Hepatic steatosis







Electronically signed by:  Roshan Carlson M.D.

8/20/2018 2:28 PM



Dictated Date/Time:  8/20/2018 2:21 PM

## 2018-08-20 NOTE — DIAGNOSTIC IMAGING REPORT
CT (CHEST) THORAX WITH



CLINICAL HISTORY: 63 years-old Male presenting with Hodgkin's lymphoma. 



TECHNIQUE: Multidetector CT imaging of the chest was performed after the

administration of intravenous contrast. IV contrast: 92 mL of Optiray 320. A

dose lowering technique was used consistent with the principles of ALARA (as low

as reasonably achievable).



COMPARISON: 5/29/2018.



CT DOSE (mGy.cm): The estimated cumulative dose is 1691.63.



FINDINGS:



 topogram: Cholecystectomy clips and right pelvic surgical clip.



On soft tissue windows, normal thyroid and thoracic inlet. Bilateral

gynecomastia. No axillary or supraclavicular lymphadenopathy. Stable enlarged

mediastinal lymphadenopathy. The dominant precarinal lymph node measures 17 mm

in the short axis, previously 17 mm when remeasured at a comparable level

(series 6 image 120). Atherosclerosis of the aorta. Normal heart size. Coronary

artery calcification. No pericardial or pleural effusion. Hepatic steatosis.

Enlarged gastrohepatic lymph node measuring 2.5 x 3.1 cm, previously 2.4 x 3.0

cm when remeasured at a comparable level (series 6 image 301). Additional

enlarged portacaval and peripancreatic lymph nodes as on prior.



On lung windows, minimal dependent changes likely atelectasis. No other focal

nodule or infiltrate. Airways patent. No pneumothorax.



On bone windows, degenerative changes of the spine. No destructive osseous

lesion.



IMPRESSION:

1.  Stable mediastinal and upper abdominal lymphadenopathy.



2.  No parenchymal lymphomatous involvement in the lungs.



3.  Hepatic steatosis.







Electronically signed by:  Tashi Monroy M.D.

8/20/2018 1:53 PM



Dictated Date/Time:  8/20/2018 1:44 PM
